# Patient Record
(demographics unavailable — no encounter records)

---

## 2023-10-04 RX ORDER — LEVOTHYROXINE SODIUM 150 UG/1
150 TABLET ORAL
Qty: 90 TABLET | Refills: 0 | OUTPATIENT
Start: 2023-10-04

## 2023-10-05 DIAGNOSIS — E03.9 HYPOTHYROIDISM, UNSPECIFIED TYPE: ICD-10-CM

## 2023-10-05 NOTE — TELEPHONE ENCOUNTER
Pt called in for refill on Levothyroxine Sodium 150 mcg tablet to her [facility] [place]. Please advise

## 2023-10-06 RX ORDER — LEVOTHYROXINE SODIUM 150 UG/1
150 TABLET ORAL
Qty: 90 TABLET | Refills: 0 | OUTPATIENT
Start: 2023-10-06

## 2023-10-06 NOTE — TELEPHONE ENCOUNTER
Rx Request received.   Rx needed levothyroxine  Pharmacy [facility]  Last appointment 10/26/2021  PCP [facility]    PATIENT NEEDS APPOINTMENT BEFORE PCP CAN REFILL RX. PLEASE SCHEDULE    JERILYN THOMAS on 10/6/23 at 11:07 AM.

## 2023-10-13 RX ORDER — LEVOTHYROXINE SODIUM 150 UG/1
150 TABLET ORAL
Qty: 90 TABLET | Refills: 0 | Status: SHIPPED | OUTPATIENT
Start: 2023-10-13